# Patient Record
Sex: MALE | Race: WHITE | NOT HISPANIC OR LATINO | Employment: FULL TIME | URBAN - METROPOLITAN AREA
[De-identification: names, ages, dates, MRNs, and addresses within clinical notes are randomized per-mention and may not be internally consistent; named-entity substitution may affect disease eponyms.]

---

## 2018-04-25 ENCOUNTER — HOSPITAL ENCOUNTER (OUTPATIENT)
Dept: GENERAL RADIOLOGY | Facility: HOSPITAL | Age: 43
Discharge: HOME | End: 2018-04-25
Attending: FAMILY MEDICINE | Admitting: FAMILY MEDICINE

## 2018-04-25 ENCOUNTER — TRANSCRIBE ORDERS (OUTPATIENT)
Dept: ADMINISTRATIVE | Facility: HOSPITAL | Age: 43
End: 2018-04-25

## 2018-04-25 DIAGNOSIS — R07.9 CHEST PAIN IN ADULT: Primary | ICD-10-CM

## 2018-04-25 PROCEDURE — 71046 X-RAY EXAM CHEST 2 VIEWS: CPT

## 2018-10-09 ENCOUNTER — TRANSCRIBE ORDERS (OUTPATIENT)
Dept: ADMINISTRATIVE | Facility: HOSPITAL | Age: 43
End: 2018-10-09

## 2018-10-09 ENCOUNTER — HOSPITAL ENCOUNTER (OUTPATIENT)
Dept: ULTRASOUND IMAGING | Facility: HOSPITAL | Age: 43
Discharge: HOME | End: 2018-10-09
Admitting: NURSE PRACTITIONER

## 2018-10-09 DIAGNOSIS — M79.89 SWELLING OF RIGHT LOWER EXTREMITY: Primary | ICD-10-CM

## 2018-10-09 PROCEDURE — 93971 EXTREMITY STUDY: CPT

## 2023-09-01 ENCOUNTER — TRANSCRIBE ORDERS (OUTPATIENT)
Dept: ADMINISTRATIVE | Facility: HOSPITAL | Age: 48
End: 2023-09-01
Payer: OTHER MISCELLANEOUS

## 2023-09-01 DIAGNOSIS — K21.9 GASTROESOPHAGEAL REFLUX DISEASE WITHOUT ESOPHAGITIS: Primary | ICD-10-CM

## 2023-09-18 ENCOUNTER — HOSPITAL ENCOUNTER (OUTPATIENT)
Dept: GENERAL RADIOLOGY | Facility: HOSPITAL | Age: 48
Discharge: HOME | End: 2023-09-18
Admitting: INTERNAL MEDICINE
Payer: COMMERCIAL

## 2023-09-18 DIAGNOSIS — K21.9 GASTROESOPHAGEAL REFLUX DISEASE WITHOUT ESOPHAGITIS: ICD-10-CM

## 2023-09-18 PROCEDURE — 74220 X-RAY XM ESOPHAGUS 1CNTRST: CPT

## 2023-11-17 ENCOUNTER — OFFICE VISIT (OUTPATIENT)
Dept: UROLOGY | Facility: CLINIC | Age: 48
End: 2023-11-17
Payer: COMMERCIAL

## 2023-11-17 VITALS — HEIGHT: 70 IN | BODY MASS INDEX: 34.36 KG/M2 | HEART RATE: 86 BPM | WEIGHT: 240 LBS | OXYGEN SATURATION: 100 %

## 2023-11-17 DIAGNOSIS — N52.9 VASCULOGENIC ERECTILE DYSFUNCTION, UNSPECIFIED VASCULOGENIC ERECTILE DYSFUNCTION TYPE: Primary | ICD-10-CM

## 2023-11-17 LAB
BILIRUB BLD-MCNC: NEGATIVE MG/DL
CLARITY, POC: CLEAR
COLOR UR: YELLOW
EXPIRATION DATE: NORMAL
GLUCOSE UR STRIP-MCNC: NEGATIVE MG/DL
KETONES UR QL: NEGATIVE
LEUKOCYTE EST, POC: NEGATIVE
Lab: NORMAL
NITRITE UR-MCNC: NEGATIVE MG/ML
PH UR: 5.5 [PH] (ref 5–8)
PROT UR STRIP-MCNC: NEGATIVE MG/DL
RBC # UR STRIP: NEGATIVE /UL
SP GR UR: 1.03 (ref 1–1.03)
UROBILINOGEN UR QL: NORMAL

## 2023-11-17 RX ORDER — LISINOPRIL AND HYDROCHLOROTHIAZIDE 20; 12.5 MG/1; MG/1
1 TABLET ORAL DAILY
COMMUNITY
Start: 2023-08-30

## 2023-11-17 RX ORDER — ROSUVASTATIN CALCIUM 10 MG/1
10 TABLET, COATED ORAL NIGHTLY
COMMUNITY
Start: 2023-08-30

## 2023-11-17 RX ORDER — DULAGLUTIDE 1.5 MG/.5ML
1.5 INJECTION, SOLUTION SUBCUTANEOUS WEEKLY
COMMUNITY
Start: 2023-10-12

## 2023-11-17 RX ORDER — SILDENAFIL 50 MG/1
50 TABLET, FILM COATED ORAL DAILY PRN
COMMUNITY
End: 2023-11-17

## 2023-11-17 RX ORDER — TADALAFIL 20 MG/1
20 TABLET ORAL DAILY PRN
Qty: 30 TABLET | Refills: 3 | Status: SHIPPED | OUTPATIENT
Start: 2023-11-17

## 2023-11-17 NOTE — PROGRESS NOTES
Office Visit New Urology      Patient Name: Dony Lobo  : 1975   MRN: [id number]     Chief Complaint:    Chief Complaint   Patient presents with    Erectile Dysfunction    Intermitten urinary stream     Urinary Frequency       Referring Provider: Inna Samuels MD    History of Present Illness: Dony Lobo is a 48 y.o. male who presents to Urology today for erectile dysfunction. He reports inability to maintain erections, he reports this began about 3 years ago. He reports psychological component as well as he recently went through a divorce. He has been taking Sildenafil 50mg as needed. He reports inability to obtain full erection or maintain erections with sildenafil, he reports congestion with medication.     He is diabetic, he has been taking Trulicity and he reports intentional weight loss of 25 lbs over the last few months. Last HgB A1c was 7.3 on 23. He is planning to have A1c rechecked soon with PCP. Total Testosterone 23 was normal at 404.     He is a former smoker. He quit smoking in .   IPSS 9.   Subjective      Review of System: Review of Systems   Genitourinary:  Negative for dysuria, flank pain, frequency and urgency.        Erectile dysfunction      I have reviewed the ROS documented by my clinical staff, I have updated appropriately and I agree. CHAPARRO Browning    Past Medical History:   Past Medical History:   Diagnosis Date    Diabetes mellitus     Erectile dysfunction     Hypertension        Past Surgical History:   Past Surgical History:   Procedure Laterality Date    EYE SURGERY         Family History: History reviewed. No pertinent family history.    Social History:   Social History     Socioeconomic History    Marital status:    Tobacco Use    Smoking status: Former     Packs/day: 1.00     Years: 18.00     Additional pack years: 0.00     Total pack years: 18.00     Types: Cigarettes     Quit date:      Years since quitting:  11.8     Passive exposure: Past    Smokeless tobacco: Never   Vaping Use    Vaping Use: Never used   Substance and Sexual Activity    Alcohol use: Yes    Sexual activity: Yes       Medications:     Current Outpatient Medications:     lisinopril-hydrochlorothiazide (PRINZIDE,ZESTORETIC) 20-12.5 MG per tablet, Take 1 tablet by mouth Daily., Disp: , Rfl:     rosuvastatin (CRESTOR) 10 MG tablet, Take 1 tablet by mouth Every Night., Disp: , Rfl:     Trulicity 1.5 MG/0.5ML solution pen-injector, Inject 1.5 mg under the skin into the appropriate area as directed 1 (One) Time Per Week., Disp: , Rfl:     tadalafil (Cialis) 20 MG tablet, Take 1 tablet by mouth Daily As Needed for Erectile Dysfunction., Disp: 30 tablet, Rfl: 3    Allergies:   Allergies   Allergen Reactions    Bee Venom Anaphylaxis    Penicillins Unknown - Low Severity       IPSS Questionnaire (AUA-7):  Over the past month…    1)  Incomplete Emptying:       How often have you had a sensation of not emptying you had the sensation of not emptying your bladder completely after you finished urinating?  0 - Not at all   2)  Frequency:       How often have you had the urinate again less than two hours after you finished urinating?  3 - About half the time   3)  Intermittency:       How often have you found you stopped and started again several times when you urinated?   1 - Less than 1 time in 5   4) Urgency:      How often have you found it difficult to postpone urination?  2 - Less than half the time   5) Weak Stream:      How often have you had a weak urinary stream?  1 - Less than 1 time in 5   6) Straining:       How often have you had to push or strain to begin urination?  0 - Not at all   7) Nocturia:      How many times did you most typically get up to urinate from the time you went to bed at night until the time you got up in the morning?  2 - 2 times   Total Score:  9   The International Prostate Symptom Score (IPSS) is used to screen, diagnose, track  "symptoms of benign prostatic hyperplasia (BPH).   0-7 (Mild Symptoms) 8-19 (Moderate) 20-35 (Severe)   Quality of Life (QoL):  If you were to spend the rest of your life with your urinary condition just the way it is now, how would you feel about that? 3-Mixed   Urine Leakage (Incontinence) 0-No Leakage       Sexual Health Inventory for Men (JAROCHO)   Over the past 6 months:     1. How do you rate your confidence that you could get and keep an erection?  1 - Very low   2. When you had erections with sexual   stimulation, how often were your erections hard enough for penetration (entering your partner)?  1 - Almost never or never   3. During sexual intercourse, how often were you able to maintain your erection after you had penetrated (entered) your partner?  1 - Almost never or never   4. During sexual intercourse, how difficult was it to maintain your erection to completion of intercourse?  1 - Extremely difficult   5. When you attempted sexual intercourse, how often was it satisfactory for you?  1 - Almost never or never    Total Score: 5   The Sexual Health Inventory for Men further classifies ED severity with the following breakpoints:   1-7 (Severe ED) 8-11 (Moderate ED) 12-16 (Mild to Moderate ED) 17-21 (Mild ED)        Objective     Physical Exam:   Vital Signs:   Vitals:    11/17/23 0833   Pulse: 86   SpO2: 100%   Weight: 109 kg (240 lb)   Height: 177.8 cm (70\")   PainSc: 0-No pain     Body mass index is 34.44 kg/m².     Physical Exam  Vitals and nursing note reviewed.   Constitutional:       Appearance: Normal appearance.   HENT:      Head: Normocephalic and atraumatic.      Nose: Nose normal.      Mouth/Throat:      Mouth: Mucous membranes are moist.   Eyes:      Pupils: Pupils are equal, round, and reactive to light.   Pulmonary:      Effort: Pulmonary effort is normal.   Abdominal:      General: Abdomen is flat.      Palpations: Abdomen is soft.   Musculoskeletal:         General: Normal range of motion. " "     Cervical back: Normal range of motion.   Skin:     General: Skin is warm and dry.      Capillary Refill: Capillary refill takes less than 2 seconds.   Neurological:      General: No focal deficit present.      Mental Status: He is alert.   Psychiatric:         Mood and Affect: Mood normal.       Labs:   Brief Urine Lab Results       None                 No results found for: \"GLUCOSE\", \"CALCIUM\", \"NA\", \"K\", \"CO2\", \"CL\", \"BUN\", \"CREATININE\", \"EGFRIFAFRI\", \"EGFRIFNONA\", \"BCR\", \"ANIONGAP\"    No results found for: \"WBC\", \"HGB\", \"HCT\", \"MCV\", \"PLT\"    Images:   FL Esophagram Complete Single Contrast    Result Date: 9/18/2023  Minimal gastroesophageal reflux.          Images were reviewed, interpreted, and dictated by Dr. Jer Krishnamurthy MD Transcribed by Amanda Wood PA-C.  This report was signed and finalized on 9/18/2023 3:36 PM by Jre Krishnamurthy MD.        Measures:   Tobacco:   Dony Lobo  reports that he quit smoking about 11 years ago. His smoking use included cigarettes. He has a 18.00 pack-year smoking history. He has been exposed to tobacco smoke. He has never used smokeless tobacco..        Urine Incontinence: Patient reports that he is not currently experiencing any symptoms of urinary incontinence.   Assessment / Plan      Assessment/Plan:   Dony Lobo is a 48 y.o. male who presented today for history of erectile dysfunction.     He will stop Sildenafil and begin Cialis 20mg as needed for erectile dysfunction. We discussed to take medication 30 minutes prior to intercourse. We discussed importance of diabetes control and improving HgB A1c. He is understanding and agreeable. He was provided a list of sex therapists that he can call at his convenience. He will follow up in 3 months for symptom check.      Diagnoses and all orders for this visit:    1. Vasculogenic erectile dysfunction, unspecified vasculogenic erectile dysfunction type (Primary)  -     tadalafil (Cialis) 20 MG tablet; " Take 1 tablet by mouth Daily As Needed for Erectile Dysfunction.  Dispense: 30 tablet; Refill: 3         Follow Up:   Return in about 3 months (around 2/17/2024).    I spent approximately 30 minutes providing clinical care for this patient; including review of patient's chart and provider documentation, face to face time spent with patient in examination room (obtaining history, performing physical exam, discussing diagnosis and management options), placing orders, and completing patient documentation.     CHAPARRO Browning  [facility] [place]

## 2024-02-21 ENCOUNTER — OFFICE VISIT (OUTPATIENT)
Dept: UROLOGY | Facility: CLINIC | Age: 49
End: 2024-02-21
Payer: COMMERCIAL

## 2024-02-21 VITALS — OXYGEN SATURATION: 99 % | HEIGHT: 70 IN | HEART RATE: 81 BPM | WEIGHT: 240 LBS | BODY MASS INDEX: 34.36 KG/M2

## 2024-02-21 DIAGNOSIS — N52.9 VASCULOGENIC ERECTILE DYSFUNCTION, UNSPECIFIED VASCULOGENIC ERECTILE DYSFUNCTION TYPE: Primary | ICD-10-CM

## 2024-02-21 RX ORDER — SEMAGLUTIDE 0.68 MG/ML
0.5 INJECTION, SOLUTION SUBCUTANEOUS WEEKLY
COMMUNITY
Start: 2023-12-06

## 2024-02-21 RX ORDER — TAMSULOSIN HYDROCHLORIDE 0.4 MG/1
1 CAPSULE ORAL DAILY
COMMUNITY
Start: 2024-01-23

## 2024-02-21 NOTE — PROGRESS NOTES
Erectile Dysfunction Office Visit      Patient Name: Dony Lobo  : 1975   MRN: [id number]     Chief Complaint:  Erectile Dysfunction.   Chief Complaint   Patient presents with    Erectile Dysfunction       Referring Provider: No ref. provider found    History of Present Illness: Dony Lobo is a 48 y.o. male who presents today for follow up of erectile dysfunction.  Last seen in clinic on 2023 with CHAPARRO Mathis.  He been having about 3 years or so of difficulties maintaining erection.  There was some psychological component as well.  He had a normal total testosterone.  No help with 50 mg of sildenafil in the past.  He was prescribed 20 mg Cialis as needed as well as given a list of sex therapists, and counseled on the benefits of improved diabetic control on erections.    Patient states no significant improvement on the Cialis.  He states that he is still able to obtain erections but is difficult for him to maintain.  He is not currently in a steady romantic relationship and has more sporadic sexual encounters.  When asked how he has been using the Cialis, he states that he takes the 20 mg daily in the morning.  He states he did not know it was to be taken as needed prior to sexual activity.  He does state however that he is tolerating the medication well and denies any side effects.  He has not talked to a sex therapist though states that he has gotten over some of the psychological issues regarding his prior divorce.  He denies any loss of libido, decreased energy, depressed mood.  He has not had a recent A1c but is now on Ozempic for diabetes control.  He otherwise has no new urologic complaints today.    Subjective      Review of System: Review of Systems   Genitourinary:  Negative for decreased urine volume, difficulty urinating, dysuria, enuresis, flank pain, frequency, hematuria and urgency.      I have reviewed the ROS documented by my clinical staff, I have updated  "appropriately and I agree. Eleazar Esquivel PA-C    Past Medical History:   Past Medical History:   Diagnosis Date    Diabetes mellitus     Erectile dysfunction     Hypertension        Past Surgical History:   Past Surgical History:   Procedure Laterality Date    EYE SURGERY         Family History: History reviewed. No pertinent family history.    Social History:   Social History     Socioeconomic History    Marital status:    Tobacco Use    Smoking status: Former     Packs/day: 1.00     Years: 18.00     Additional pack years: 0.00     Total pack years: 18.00     Types: Cigarettes     Quit date:      Years since quittin.1     Passive exposure: Past    Smokeless tobacco: Never   Vaping Use    Vaping Use: Never used   Substance and Sexual Activity    Alcohol use: Yes    Sexual activity: Yes       Medications:     Current Outpatient Medications:     lisinopril-hydrochlorothiazide (PRINZIDE,ZESTORETIC) 20-12.5 MG per tablet, Take 1 tablet by mouth Daily., Disp: , Rfl:     Ozempic, 0.25 or 0.5 MG/DOSE, 2 MG/3ML solution pen-injector, Inject 0.5 mg under the skin into the appropriate area as directed 1 (One) Time Per Week., Disp: , Rfl:     rosuvastatin (CRESTOR) 10 MG tablet, Take 1 tablet by mouth Every Night., Disp: , Rfl:     tadalafil (Cialis) 20 MG tablet, Take 1 tablet by mouth Daily As Needed for Erectile Dysfunction., Disp: 30 tablet, Rfl: 3    tamsulosin (FLOMAX) 0.4 MG capsule 24 hr capsule, Take 1 capsule by mouth Daily., Disp: , Rfl:     Allergies:   Allergies   Allergen Reactions    Bee Venom Anaphylaxis    Metformin GI Intolerance    Penicillins Unknown - Low Severity         Objective     Physical Exam:   Vital Signs:   Vitals:    24 0915   Pulse: 81   SpO2: 99%   Weight: 109 kg (240 lb)   Height: 177.8 cm (70\")   PainSc: 0-No pain     Body mass index is 34.44 kg/m².     Physical Exam  Vitals and nursing note reviewed.   Constitutional:       Appearance: Normal appearance.   HENT:     " " Head: Normocephalic and atraumatic.      Mouth/Throat:      Mouth: Mucous membranes are moist.      Pharynx: Oropharynx is clear.   Eyes:      Extraocular Movements: Extraocular movements intact.      Conjunctiva/sclera: Conjunctivae normal.   Cardiovascular:      Rate and Rhythm: Normal rate and regular rhythm.   Pulmonary:      Effort: Pulmonary effort is normal. No respiratory distress.   Abdominal:      Palpations: Abdomen is soft.      Tenderness: There is no abdominal tenderness. There is no right CVA tenderness or left CVA tenderness.   Musculoskeletal:         General: Normal range of motion.      Cervical back: Normal range of motion.   Skin:     General: Skin is warm and dry.   Neurological:      General: No focal deficit present.      Mental Status: He is alert and oriented to person, place, and time.   Psychiatric:         Mood and Affect: Mood normal.         Behavior: Behavior normal.         Labs:   No results found for: \"TESTOSTERONE\", \"PROLACTIN\", \"FSH\", \"LH\", \"HCT\"    No results found for: \"PSA\"    Images:   No Images in the past 120 days found..    Measures:   Tobacco:   Dony Lobo  reports that he quit smoking about 12 years ago. His smoking use included cigarettes. He has a 18.00 pack-year smoking history. He has been exposed to tobacco smoke. He has never used smokeless tobacco..     Assessment / Plan      Assessment/Plan:   Mr. Lobo is a 48 y.o. male with erectile dysfunction.  He has been taking 20 mg Cialis daily in the morning.  We discussed that this medication should be taken as needed 30 minutes to an hour prior to sexual activity.  We also discussed and importance of good glycemic control and to continue with weight loss and exercise as he is already been doing.  Follow up with CHAPARRO Mathis in 3 months to reassess how he is doing with taking the Cialis as needed prior to sexual activity.  He is on the max dose and if this does not give him significant improvements, we " discussed possibility of intracavernosal injections.  He does call the office the meantime with any questions or concerns or for refills.  He is happy with this plan.    Diagnoses and all orders for this visit:    1. Vasculogenic erectile dysfunction, unspecified vasculogenic erectile dysfunction type (Primary)       Patient Education:   1.   I discussed treatment options including oral PDE5- medication, intra-urethral suppositories, pharmacologic injections, vacuum erection devices and implantable penile prostheses.    2. The patient was educated about Cialis.  He was counseled on appropriate use, timing and the need for sexual stimulation.  In addition, he understands not to use this medication with nitrates. He was instructed to take the medication about 1 hour prior to sexual activity.  Side effects were explained to the patient such as headache, visual changes, upset stomach, and back pain. Lastly, he was instructed to go immediately to his nearest emergency room in the event he developed an erection lasting longer than 3 hours. He voiced understanding of all these instructions and the importance of seeking prompt medical attention for an erection lasting longer than 3 hours.     3. We also discussed association of erectile dysfunction and future coronary events. Erectile dysfunction can be a marker for future coronary events and usually precedes by 2-4 years.  Recommended he discuss EKG and possible stress test with his PCP and following his cholesterol.      Follow Up:   Return in about 3 months (around 5/21/2024) for f/u with dawit.    I spent approximately 30 minutes providing clinical care for this patient; including review of patient's chart and provider documentation, face to face time spent with patient in examination room (obtaining history, performing physical exam, discussing diagnosis and management options), placing orders, and completing patient documentation.     Eleazar Esquivel PA-C  [facility] [place]  [facility] [place]

## 2024-05-22 ENCOUNTER — OFFICE VISIT (OUTPATIENT)
Dept: UROLOGY | Facility: CLINIC | Age: 49
End: 2024-05-22
Payer: COMMERCIAL

## 2024-05-22 VITALS
WEIGHT: 240 LBS | OXYGEN SATURATION: 98 % | SYSTOLIC BLOOD PRESSURE: 132 MMHG | BODY MASS INDEX: 34.36 KG/M2 | HEART RATE: 65 BPM | DIASTOLIC BLOOD PRESSURE: 62 MMHG | HEIGHT: 70 IN

## 2024-05-22 DIAGNOSIS — N52.9 VASCULOGENIC ERECTILE DYSFUNCTION, UNSPECIFIED VASCULOGENIC ERECTILE DYSFUNCTION TYPE: ICD-10-CM

## 2024-05-22 NOTE — PROGRESS NOTES
Low Testosterone Office Visit      Patient Name: Dony Lobo  : 1975   MRN: [id number]     Chief Complaint: Low Testosterone.    Chief Complaint   Patient presents with    Vasculogenic erectile dysfunction, unspecified vasculogenic   .     Referring Provider: No ref. provider found    History of Present Illness: Mr. Lobo is a 48 y.o. male with history of erectile dysfunction. He has previously failed Viagra, medication gave him a headache. He is taking Cialis 20mg and reports he is able to obtain/maintain erections with medication, but only while laying down. He does report taking the medication almost daily. He reports losing the erection with movement. He recently lost 30lbs and reports his diabetes is under better control.     Of note; he reports right testicle pain and swelling one month ago that has resolved.     IPSS 3.   Subjective      Review of System: Review of Systems   Constitutional: Negative.    HENT: Negative.     Eyes: Negative.    Respiratory: Negative.     Cardiovascular: Negative.    Gastrointestinal: Negative.    Endocrine: Negative.    Genitourinary: Negative.         Erectile dysfunction   Musculoskeletal: Negative.    Skin: Negative.    Allergic/Immunologic: Negative.    Neurological: Negative.    Hematological: Negative.    Psychiatric/Behavioral: Negative.        I have reviewed the ROS documented by my clinical staff, I have updated appropriately and I agree. CHAPARRO Browning    Past Medical History:  Past Medical History:   Diagnosis Date    Diabetes mellitus     Erectile dysfunction     Hypertension        Past Surgical History:  Past Surgical History:   Procedure Laterality Date    EYE SURGERY         Medications:    Current Outpatient Medications:     lisinopril-hydrochlorothiazide (PRINZIDE,ZESTORETIC) 20-12.5 MG per tablet, Take 1 tablet by mouth Daily., Disp: , Rfl:     Ozempic, 0.25 or 0.5 MG/DOSE, 2 MG/3ML solution pen-injector, Inject 0.5 mg under the  skin into the appropriate area as directed 1 (One) Time Per Week., Disp: , Rfl:     rosuvastatin (CRESTOR) 10 MG tablet, Take 1 tablet by mouth Every Night., Disp: , Rfl:     tadalafil (Cialis) 20 MG tablet, Take 1 tablet by mouth Daily As Needed for Erectile Dysfunction., Disp: 30 tablet, Rfl: 1    Allergies:  Allergies   Allergen Reactions    Bee Venom Anaphylaxis    Metformin GI Intolerance    Penicillins Unknown - Low Severity       Social History:  Social History     Socioeconomic History    Marital status:    Tobacco Use    Smoking status: Former     Current packs/day: 0.00     Average packs/day: 1 pack/day for 18.0 years (18.0 ttl pk-yrs)     Types: Cigarettes     Start date:      Quit date:      Years since quittin.4     Passive exposure: Past    Smokeless tobacco: Never   Vaping Use    Vaping status: Never Used   Substance and Sexual Activity    Alcohol use: Yes    Sexual activity: Yes       Family History:  History reviewed. No pertinent family history.    IPSS Questionnaire (AUA-7):  Over the past month…    1)  Incomplete Emptying:       How often have you had a sensation of not emptying you had the sensation of not emptying your bladder completely after you finished urinating?  0 - Not at all   2)  Frequency:       How often have you had the urinate again less than two hours after you finished urinating?  2 - Less than half the time   3)  Intermittency:       How often have you found you stopped and started again several times when you urinated?   0 - Not at all   4) Urgency:      How often have you found it difficult to postpone urination?  0 - Not at all   5) Weak Stream:      How often have you had a weak urinary stream?  0 - Not at all   6) Straining:       How often have you had to push or strain to begin urination?  0 - Not at all   7) Nocturia:      How many times did you most typically get up to urinate from the time you went to bed at night until the time you got up in the  "morning?  1 - 1 time   Total Score:  3   The International Prostate Symptom Score (IPSS) is used to screen, diagnose, track symptoms of benign prostatic hyperplasia (BPH).   0-7 (Mild Symptoms) 8-19 (Moderate) 20-35 (Severe)   Quality of Life (QoL):  If you were to spend the rest of your life with your urinary condition just the way it is now, how would you feel about that? 2-Mostly Satisfied   Urine Leakage (Incontinence) 0-No Leakage         Objective     Physical Exam:   Vital Signs:   Vitals:    05/22/24 1329   BP: 132/62   Pulse: 65   SpO2: 98%   Weight: 109 kg (240 lb)   Height: 177.8 cm (70\")     Body mass index is 34.44 kg/m².     Physical Exam  Vitals and nursing note reviewed.   Constitutional:       Appearance: Normal appearance.   HENT:      Head: Normocephalic and atraumatic.      Nose: Nose normal.      Mouth/Throat:      Mouth: Mucous membranes are moist.   Eyes:      Pupils: Pupils are equal, round, and reactive to light.   Pulmonary:      Effort: Pulmonary effort is normal.   Abdominal:      General: Abdomen is flat.      Palpations: Abdomen is soft.   Genitourinary:     Penis: Normal.       Testes: Normal.      Comments: Nontender to palpation of bilateral epididymis. No obvious swelling. Right testicle mildly retracted.  Musculoskeletal:         General: Normal range of motion.      Cervical back: Normal range of motion.   Skin:     General: Skin is warm and dry.      Capillary Refill: Capillary refill takes less than 2 seconds.   Neurological:      General: No focal deficit present.      Mental Status: He is alert.   Psychiatric:         Mood and Affect: Mood normal.       Labs:   No results found for: \"TESTOSTERONE\"    No results found for: \"PSA\"    No results found for: \"WBC\", \"HGB\", \"HCT\", \"MCV\", \"PLT\"    Images:   No Images in the past 120 days found..    Measures:   Tobacco:   Dony Lobo  reports that he quit smoking about 12 years ago. His smoking use included cigarettes. He " started smoking about 30 years ago. He has a 18 pack-year smoking history. He has been exposed to tobacco smoke. He has never used smokeless tobacco.            Urine Incontinence: Patient reports that he is not currently experiencing any symptoms of urinary incontinence.    Assessment / Plan      Assessment/Plan:   Mr. Lobo is a 48 y.o. male who presented today with erectile dysfunction.     We discussed continuing Cialis 20mg as needed vs. Proceeding with ICI (Trimix). He defers injections at this time. He prefers to continue lifestyle modifications and taking Cialis 20mg as needed. We discussed Cialis 20mg is not a daily medication and should be taken as needed. He is agreeable. Follow up in 6 months for symptom check.      Diagnoses and all orders for this visit:    1. Vasculogenic erectile dysfunction, unspecified vasculogenic erectile dysfunction type  -     tadalafil (Cialis) 20 MG tablet; Take 1 tablet by mouth Daily As Needed for Erectile Dysfunction.  Dispense: 30 tablet; Refill: 1         Follow Up:   Return in about 6 months (around 11/22/2024).    I spent approximately  minutes providing clinical care for this patient; including review of patient's chart and provider documentation, face to face time spent with patient in examination room (obtaining history, performing physical exam, discussing diagnosis and management options), placing orders, and completing patient documentation.     CHAPARRO Browning  [facility] [place] [facility] [place]

## 2024-05-23 RX ORDER — TADALAFIL 20 MG/1
20 TABLET ORAL DAILY PRN
Qty: 30 TABLET | Refills: 1 | Status: SHIPPED | OUTPATIENT
Start: 2024-05-23

## 2025-08-21 ENCOUNTER — OFFICE VISIT (OUTPATIENT)
Dept: UROLOGY | Facility: CLINIC | Age: 50
End: 2025-08-21
Payer: COMMERCIAL

## 2025-08-21 VITALS — SYSTOLIC BLOOD PRESSURE: 145 MMHG | DIASTOLIC BLOOD PRESSURE: 87 MMHG | OXYGEN SATURATION: 97 % | HEART RATE: 79 BPM

## 2025-08-21 DIAGNOSIS — N52.9 VASCULOGENIC ERECTILE DYSFUNCTION, UNSPECIFIED VASCULOGENIC ERECTILE DYSFUNCTION TYPE: Primary | ICD-10-CM

## 2025-08-21 PROCEDURE — 99213 OFFICE O/P EST LOW 20 MIN: CPT | Performed by: NURSE PRACTITIONER

## 2025-08-21 RX ORDER — METHOCARBAMOL 500 MG/1
TABLET, FILM COATED ORAL
COMMUNITY
Start: 2025-06-06